# Patient Record
Sex: MALE | Race: WHITE | Employment: UNEMPLOYED | URBAN - NONMETROPOLITAN AREA
[De-identification: names, ages, dates, MRNs, and addresses within clinical notes are randomized per-mention and may not be internally consistent; named-entity substitution may affect disease eponyms.]

---

## 2017-12-14 ENCOUNTER — HOSPITAL ENCOUNTER (EMERGENCY)
Age: 37
Discharge: HOME | End: 2017-12-15
Attending: FAMILY MEDICINE

## 2017-12-14 VITALS
SYSTOLIC BLOOD PRESSURE: 151 MMHG | DIASTOLIC BLOOD PRESSURE: 99 MMHG | TEMPERATURE: 101.7 F | OXYGEN SATURATION: 94 % | HEART RATE: 104 BPM | RESPIRATION RATE: 18 BRPM

## 2017-12-14 DIAGNOSIS — R22.0 LEFT FACIAL SWELLING: ICD-10-CM

## 2017-12-14 DIAGNOSIS — B34.9 VIRAL ILLNESS: ICD-10-CM

## 2017-12-14 DIAGNOSIS — K08.89 PAIN, DENTAL: Primary | ICD-10-CM

## 2017-12-14 PROCEDURE — 6370000000 HC RX 637 (ALT 250 FOR IP): Performed by: FAMILY MEDICINE

## 2017-12-14 PROCEDURE — 99283 EMERGENCY DEPT VISIT LOW MDM: CPT

## 2017-12-14 RX ORDER — IBUPROFEN 800 MG/1
800 TABLET ORAL EVERY 8 HOURS PRN
Qty: 30 TABLET | Refills: 0 | Status: SHIPPED | OUTPATIENT
Start: 2017-12-14

## 2017-12-14 RX ORDER — OSELTAMIVIR PHOSPHATE 75 MG/1
75 CAPSULE ORAL 2 TIMES DAILY
Qty: 20 CAPSULE | Refills: 0 | Status: SHIPPED | OUTPATIENT
Start: 2017-12-14 | End: 2017-12-24

## 2017-12-14 RX ORDER — HYDROCODONE BITARTRATE AND ACETAMINOPHEN 5; 325 MG/1; MG/1
1 TABLET ORAL EVERY 6 HOURS PRN
Qty: 6 TABLET | Refills: 0 | Status: SHIPPED | OUTPATIENT
Start: 2017-12-14 | End: 2017-12-21

## 2017-12-14 RX ORDER — AMOXICILLIN 500 MG/1
500 CAPSULE ORAL 3 TIMES DAILY
Qty: 30 CAPSULE | Refills: 0 | Status: SHIPPED | OUTPATIENT
Start: 2017-12-14 | End: 2017-12-24

## 2017-12-14 RX ORDER — AMOXICILLIN AND CLAVULANATE POTASSIUM 875; 125 MG/1; MG/1
1 TABLET, FILM COATED ORAL ONCE
Status: COMPLETED | OUTPATIENT
Start: 2017-12-15 | End: 2017-12-15

## 2017-12-14 ASSESSMENT — PAIN DESCRIPTION - PAIN TYPE: TYPE: ACUTE PAIN

## 2017-12-15 PROCEDURE — 6370000000 HC RX 637 (ALT 250 FOR IP): Performed by: FAMILY MEDICINE

## 2017-12-15 PROCEDURE — 6370000000 HC RX 637 (ALT 250 FOR IP)

## 2017-12-15 RX ADMIN — BENZOCAINE, BUTAMBEN, AND TETRACAINE HYDROCHLORIDE: .028; .004; .004 AEROSOL, SPRAY TOPICAL at 00:05

## 2017-12-15 RX ADMIN — AMOXICILLIN AND CLAVULANATE POTASSIUM 1 TABLET: 875; 125 TABLET, FILM COATED ORAL at 00:05

## 2017-12-15 RX ADMIN — LIDOCAINE HYDROCHLORIDE 15 ML: 20 SOLUTION ORAL; TOPICAL at 00:05

## 2017-12-15 NOTE — ED PROVIDER NOTES
history on file. He reports that he does not drink alcohol or use drugs. PHYSICAL EXAM     INITIAL VITALS:  tympanic temperature is 101.7 °F (38.7 °C). His blood pressure is 151/99 (abnormal) and his pulse is 104. His respiration is 18 and oxygen saturation is 94%. Physical Exam   Constitutional: Patient is oriented to person, place, and time. Patient appears well-developed and well-nourished. Patient is active and cooperative. HENT:   Head: Normocephalic and atraumatic. Head is without contusion. Right Ear: Hearing and external ear normal. No drainage. Left Ear: Hearing and external ear normal. No drainage. Nose: Nose normal. No nasal deformity. No epistaxis. Mouth/Throat: Mucous membranes are not dry. Negative exudate, mild erythema, negative oral lesions. There are noted multiple dental caries, the left lower posterior molar does appear fractured, there is no grossly surrounding erythema or abscess formation, subjective swelling in the left lower aspect of the face externally  Eyes: EOMI. Conjunctivae, sclera, and lids are normal. Right eye exhibits no discharge. Left eye exhibits no discharge. Neck: Full passive range of motion without pain and phonation normal.   Cardiovascular:  Rate ~100, regular rhythm and intact distal pulses. Pulses: Right radial pulse  2+   Pulmonary/Chest: Effort normal. No tachypnea and no bradypnea. No wheezes, rhonchi, or rales. Abdominal: Soft. Patient without distension or tenderness, no rigidity, rebound, or gaurding. There is no CVA tenderness. Musculoskeletal:   Negative acute trauma or deformity,  apparent full range of motion and normal strength all extremities appropriate to age. Neurological: Patient is alert and oriented to person, place, and time. patient displays no tremor. Patient displays no seizure activity. .  Lymphatic:  Left anterior cervical lymph adenopathy, nontender, negative posterior  Skin: Skin is warm and dry.  Patient is not diaphoretic. Psychiatric: Patient has a normal mood and affect. Patient speech is normal and behavior is normal. Cognition and memory are normal.      DIFFERENTIAL DIAGNOSIS:   Influenza, viral illness, dental infection    DIAGNOSTIC RESULTS           RADIOLOGY: non-plain film images(s) such as CT, Ultrasound and MRI are read by the radiologist.  No orders to display       LABS:   Labs Reviewed - No data to display    EMERGENCY DEPARTMENT COURSE:   Vitals:    Vitals:    12/14/17 2309   BP: (!) 151/99   Pulse: 104   Resp: 18   Temp: 101.7 °F (38.7 °C)   TempSrc: Tympanic   SpO2: 94%     Patient history physical exam taken at bedside, discussed clinical diagnosis of dental tooth fracture, though no gross infection noted to be very tender. Discussed patient's subjective fever chills and generalized body aches, discussed clinically consistent with influenza. There was discussion regarding his clear lung sounds, one to avoid any imaging, and was not felt that blood work would be needed at this time. Although patient does have a fever and elevated heart rate, is felt that diagnosis of his dental pain and influenza account for these. Confirmed allergies will give patient first dose antibiotic emergency room, as well as a dental prep for topical placement of pain control, prescriptions for antibiotic, NSAID, and pain medication for the dental aspect. Discussed influenza as a virus, although can prescribe Tamiflu as patient's symptom onset today as well as patient has a 11week-old child at home, patient does not have insurance and cost is a consideration, patient will have to decide whether he wishes to fill it. Patient states will be going back to [place] the next few days, advised to stay very well hydrated with water, discussed prevention of spread to others, acknowledges    FINAL IMPRESSION      1. Pain, dental    2. Left facial swelling    3.  Viral illness          DISPOSITION/PLAN   Discharge    PATIENT REFERRED TO:  [facility]  [address]  [phone]    As needed      DISCHARGE MEDICATIONS:  New Prescriptions    AMOXICILLIN (AMOXIL) 500 MG CAPSULE    Take 1 capsule by mouth 3 times daily for 10 days    HYDROCODONE-ACETAMINOPHEN (NORCO) 5-325 MG PER TABLET    Take 1 tablet by mouth every 6 hours as needed for Pain . IBUPROFEN (ADVIL;MOTRIN) 800 MG TABLET    Take 1 tablet by mouth every 8 hours as needed for Pain    OSELTAMIVIR (TAMIFLU) 75 MG CAPSULE    Take 1 capsule by mouth 2 times daily for 10 days           Summation      Patient Course:  Discharge    ED Medications administered this visit:    Medications   amoxicillin-clavulanate (AUGMENTIN) 875-125 MG per tablet 1 tablet (not administered)   hydrocodone-acetaminophen (NORCO) tablet 5-325 mg (STARTER PACK) (not administered)   lidocaine viscous (XYLOCAINE) 2 % solution 15 mL (not administered)   butamben-tetracaine-benzocaine (CETACAINE) 2-2-14 % spray (not administered)       New Prescriptions from this visit:    New Prescriptions    AMOXICILLIN (AMOXIL) 500 MG CAPSULE    Take 1 capsule by mouth 3 times daily for 10 days    HYDROCODONE-ACETAMINOPHEN (NORCO) 5-325 MG PER TABLET    Take 1 tablet by mouth every 6 hours as needed for Pain . IBUPROFEN (ADVIL;MOTRIN) 800 MG TABLET    Take 1 tablet by mouth every 8 hours as needed for Pain    OSELTAMIVIR (TAMIFLU) 75 MG CAPSULE    Take 1 capsule by mouth 2 times daily for 10 days       Follow-up:  [facility]  [address] [place] [address]  [phone]    As needed        Final Impression:   1. Pain, dental    2. Left facial swelling    3.  Viral illness               (Please note that portions of this note were completed with a voice recognition program.  Efforts were made to edit the dictations but occasionally words are mis-transcribed.)    MD Venessa Alexis MD  12/15/17 0010

## 2022-04-18 ENCOUNTER — HOSPITAL ENCOUNTER (OUTPATIENT)
Age: 42
Setting detail: SPECIMEN
Discharge: HOME | End: 2022-04-18
Payer: MEDICAID

## 2022-04-18 LAB
C-REACTIVE PROTEIN: 25.4 MG/L (ref 0–5)
CREAT SERPL-MCNC: 0.36 MG/DL (ref 0.7–1.2)
GFR AFRICAN AMERICAN: >60 ML/MIN
GFR NON-AFRICAN AMERICAN: >60 ML/MIN
GFR SERPL CREATININE-BSD FRML MDRD: ABNORMAL ML/MIN/{1.73_M2}
GFR SERPL CREATININE-BSD FRML MDRD: ABNORMAL ML/MIN/{1.73_M2}
HCT VFR BLD CALC: 29.2 % (ref 40.7–50.3)
HEMOGLOBIN: 8.8 G/DL (ref 13–17)
MCH RBC QN AUTO: 26.1 PG (ref 25.2–33.5)
MCHC RBC AUTO-ENTMCNC: 30.1 G/DL (ref 28.4–34.8)
MCV RBC AUTO: 86.6 FL (ref 82.6–102.9)
NRBC AUTOMATED: 0 PER 100 WBC
PDW BLD-RTO: 16.1 % (ref 11.8–14.4)
PLATELET # BLD: 348 K/UL (ref 138–453)
PMV BLD AUTO: 9.3 FL (ref 8.1–13.5)
RBC # BLD: 3.37 M/UL (ref 4.21–5.77)
WBC # BLD: 6.8 K/UL (ref 3.5–11.3)

## 2022-04-18 PROCEDURE — 82565 ASSAY OF CREATININE: CPT

## 2022-04-18 PROCEDURE — 86140 C-REACTIVE PROTEIN: CPT

## 2022-04-18 PROCEDURE — 85027 COMPLETE CBC AUTOMATED: CPT

## 2022-05-02 ENCOUNTER — HOSPITAL ENCOUNTER (OUTPATIENT)
Age: 42
Setting detail: SPECIMEN
Discharge: HOME | End: 2022-05-02
Payer: MEDICAID

## 2022-05-02 LAB
C-REACTIVE PROTEIN: 10.4 MG/L (ref 0–5)
CREAT SERPL-MCNC: 0.41 MG/DL (ref 0.7–1.2)
GFR AFRICAN AMERICAN: >60 ML/MIN
GFR NON-AFRICAN AMERICAN: >60 ML/MIN
GFR SERPL CREATININE-BSD FRML MDRD: ABNORMAL ML/MIN/{1.73_M2}
GFR SERPL CREATININE-BSD FRML MDRD: ABNORMAL ML/MIN/{1.73_M2}
HCT VFR BLD CALC: 29.5 % (ref 40.7–50.3)
HEMOGLOBIN: 8.8 G/DL (ref 13–17)
MCH RBC QN AUTO: 25.9 PG (ref 25.2–33.5)
MCHC RBC AUTO-ENTMCNC: 29.8 G/DL (ref 28.4–34.8)
MCV RBC AUTO: 86.8 FL (ref 82.6–102.9)
NRBC AUTOMATED: 0 PER 100 WBC
PDW BLD-RTO: 16.8 % (ref 11.8–14.4)
PLATELET # BLD: 255 K/UL (ref 138–453)
PMV BLD AUTO: 9.7 FL (ref 8.1–13.5)
RBC # BLD: 3.4 M/UL (ref 4.21–5.77)
WBC # BLD: 6.7 K/UL (ref 3.5–11.3)

## 2022-05-02 PROCEDURE — 85027 COMPLETE CBC AUTOMATED: CPT

## 2022-05-02 PROCEDURE — 86140 C-REACTIVE PROTEIN: CPT

## 2022-05-02 PROCEDURE — 82565 ASSAY OF CREATININE: CPT

## 2022-05-09 ENCOUNTER — HOSPITAL ENCOUNTER (OUTPATIENT)
Age: 42
Setting detail: SPECIMEN
Discharge: HOME | End: 2022-05-09
Payer: MEDICAID

## 2022-05-09 LAB
C-REACTIVE PROTEIN: 17.6 MG/L (ref 0–5)
CREAT SERPL-MCNC: 0.38 MG/DL (ref 0.7–1.2)
GFR AFRICAN AMERICAN: >60 ML/MIN
GFR NON-AFRICAN AMERICAN: >60 ML/MIN
GFR SERPL CREATININE-BSD FRML MDRD: ABNORMAL ML/MIN/{1.73_M2}
GFR SERPL CREATININE-BSD FRML MDRD: ABNORMAL ML/MIN/{1.73_M2}
HCT VFR BLD CALC: 32.4 % (ref 40.7–50.3)
HEMOGLOBIN: 9.9 G/DL (ref 13–17)
MCH RBC QN AUTO: 26.1 PG (ref 25.2–33.5)
MCHC RBC AUTO-ENTMCNC: 30.6 G/DL (ref 28.4–34.8)
MCV RBC AUTO: 85.3 FL (ref 82.6–102.9)
NRBC AUTOMATED: 0 PER 100 WBC
PDW BLD-RTO: 17 % (ref 11.8–14.4)
PLATELET # BLD: 216 K/UL (ref 138–453)
PMV BLD AUTO: 9.7 FL (ref 8.1–13.5)
RBC # BLD: 3.8 M/UL (ref 4.21–5.77)
WBC # BLD: 5.4 K/UL (ref 3.5–11.3)

## 2022-05-09 PROCEDURE — 86140 C-REACTIVE PROTEIN: CPT

## 2022-05-09 PROCEDURE — 85027 COMPLETE CBC AUTOMATED: CPT

## 2022-05-09 PROCEDURE — 82565 ASSAY OF CREATININE: CPT

## 2022-07-30 LAB
ABSOLUTE IMMATURE GRANULOCYTE: 0 10*3/UL (ref 0–0.2)
ALBUMIN: 4.9 G/DL (ref 3.5–5.7)
ALP BLD-CCNC: 112 IU/L (ref 34–104)
ALT SERPL-CCNC: 30 IU/L (ref 7–52)
AST SERPL-CCNC: 17 IU/L (ref 13–39)
BASOPHILS ABSOLUTE: 0.1 10*3/UL (ref 0–0.2)
BASOPHILS RELATIVE PERCENT: 0.6 % (ref 0–1)
BILIRUB SERPL-MCNC: 0.4 MG/DL (ref 0.3–1)
BUN BLDV-MCNC: 16 MG/DL (ref 7–25)
CALCIUM SERPL-MCNC: 9.8 MG/DL (ref 8.6–10.3)
CHLORIDE BLD-SCNC: 103 MEQ/L (ref 98–107)
CHOLESTEROL/HDL RATIO: 4.7 (ref 0–4.5)
CHOLESTEROL: 151 MG/DL (ref 120–200)
CO2: 26 MEQ/L (ref 21–31)
CREAT SERPL-MCNC: 0.68 MG/DL (ref 0.7–1.3)
EGFR IF NONAFRICAN AMERICAN: >60 ML/MIN/1.73SQ M
EOSINOPHILS ABSOLUTE: 0.2 10*3/UL (ref 0–0.5)
EOSINOPHILS RELATIVE PERCENT: 1.9 % (ref 0–6)
GLUCOSE: 87 MG/DL (ref 70–100)
HCT VFR BLD CALC: 45.1 % (ref 39–50)
HDLC SERPL-MCNC: 32 MG/DL (ref 23–92)
HEMOGLOBIN: 14.6 G/DL (ref 13–17)
IMMATURE GRANULOCYTES: 0.1 % (ref 0–1)
INR BLD: 0.94 (ref 0.91–1.16)
LDL CHOLESTEROL: 86 MG/DL (ref 0–130)
LYMPHOCYTES ABSOLUTE: 2.8 10*3/UL (ref 1.2–4)
LYMPHOCYTES RELATIVE PERCENT: 31.6 % (ref 20–45)
MCH RBC QN AUTO: 27.5 PG (ref 27–33)
MCHC RBC AUTO-ENTMCNC: 32.4 G/DL (ref 32–35)
MCV RBC AUTO: 85.1 FL (ref 82–98)
MONOCYTES ABSOLUTE: 0.7 10*3/UL (ref 0.1–1)
MONOCYTES RELATIVE PERCENT: 7.4 % (ref 5–12)
NEUTROPHILS ABSOLUTE: 5.1 10*3/UL (ref 1.6–7.6)
NEUTROPHILS RELATIVE PERCENT: 58.4 % (ref 40–72)
NON-HDL CHOLESTEROL: 119 MG/DL
NUCLEATED RED BLOOD CELLS: 0 % (ref 0–0)
PDW BLD-RTO: 14.6 % (ref 11.5–15)
PLATELET # BLD: 196 10*3/UL (ref 150–400)
POTASSIUM SERPL-SCNC: 4.2 MEQ/L (ref 3.5–5.1)
PROTEIN TOTAL: 7.5 G/DL (ref 6–8.3)
PT: 12.6 SEC (ref 12.3–14.8)
RBC: 5.3 10*6/UL (ref 4.2–5.7)
SODIUM BLD-SCNC: 138 MEQ/L (ref 136–145)
TRIGL SERPL-MCNC: 167 MG/DL (ref 40–149)
VLDLC SERPL CALC-MCNC: 33 MG/DL (ref 0–40)
WBC: 8.76 10*3/UL (ref 4–10.6)

## 2022-08-01 LAB
C-REACTIVE PROTEIN: 5.3 MG/L (ref 0–7)
ESTIMATED AVERAGE GLUCOSE: 103 MMOL/L
GLUCOSE BLD-MCNC: 97 MG/DL (ref 70–100)
HBA1C MFR BLD: 5.2 % (ref 4–6)